# Patient Record
Sex: FEMALE | Race: WHITE
[De-identification: names, ages, dates, MRNs, and addresses within clinical notes are randomized per-mention and may not be internally consistent; named-entity substitution may affect disease eponyms.]

---

## 2017-10-13 ENCOUNTER — HOSPITAL ENCOUNTER (OUTPATIENT)
Dept: HOSPITAL 63 - MAMMO | Age: 67
Discharge: HOME | End: 2017-10-13
Attending: PHYSICIAN ASSISTANT
Payer: MEDICARE

## 2017-10-13 DIAGNOSIS — R92.8: Primary | ICD-10-CM

## 2017-10-13 NOTE — RAD
DATE: 10/13/2017



EXAM: MAMMO SANDRA DIAG BILAT



HISTORY: Follow-up dilated duct.



COMPARISON: One year earlier



This study was interpreted with the benefit of Computerized Aided Detection

(CAD).



FINDINGS:



Breast Density: SCATTERED  The breast parenchyma shows scattered

fibroglandular densities. Breast parenchyma level B. 



 There has not been a significant change in the appearance of the breasts

compared to the previous exam slight increase in perihilar density about the

left breast is unchanged.  







IMPRESSION: Benign findings







BI-RADS CATEGORY: 2 BENIGN FINDING(S)



RECOMMENDED FOLLOW-UP: 12M 12 MONTH FOLLOW-UP



PQRS compliance statement: Patient information was entered into a reminder

system with a target due date 10/13/2018 for the next mammogram.



Mammography is a sensitive method for finding small breast cancers, but it

does not detect them all and is not a substitute for careful clinical

examination.  A negative mammogram does not negate a clinically suspicious

finding and should not result in delay in biopsying a clinically suspicious

abnormality.



"Our facility is accredited by the [employer] Mammography

Program."

## 2018-10-15 ENCOUNTER — HOSPITAL ENCOUNTER (OUTPATIENT)
Dept: HOSPITAL 63 - MAMMO | Age: 68
Discharge: HOME | End: 2018-10-15
Attending: PHYSICIAN ASSISTANT
Payer: MEDICARE

## 2018-10-15 DIAGNOSIS — Z12.31: Primary | ICD-10-CM

## 2018-10-15 PROCEDURE — 77063 BREAST TOMOSYNTHESIS BI: CPT

## 2018-10-15 PROCEDURE — 77067 SCR MAMMO BI INCL CAD: CPT

## 2018-10-15 NOTE — RAD
DATE: October 15, 2018



EXAM: MAMMO SANDRA SCREENING BILATERAL



HISTORY: Screening study.



COMPARISON: 2016 and 2017.



This study was interpreted with the benefit of Computerized Aided Detection

(CAD).







FINDINGS:



Breast Density: HETERO The breast parenchyma is heterogenously dense, which

could reduce sensitivity of mammography. Breast parenchyma level C.. 

Bilateral breast nodularity and coarse calculations are stable.  There are no

new dominant suspicious masses, suspicious pleomorphic microcalcifications or

evidence of architectural distortion.





IMPRESSION: No mammographic indicators for malignancy.







BI-RADS CATEGORY: 2 BENIGN FINDING



RECOMMENDED FOLLOW-UP: 12M 12 MONTH FOLLOW-UP



PQRS compliance statement: Patient information was entered into a reminder

system with a target due date October 16, 2019 for the next mammogram.



Mammography is a sensitive method for finding small breast cancers, but it

does not detect them all and is not a substitute for careful clinical

examination.  A negative mammogram does not negate a clinically suspicious

finding and should not result in delay in biopsying a clinically suspicious

abnormality.



"Our facility is accredited by the [employer] Mammography

Program."



The patient's breast density may affect the ability of mammography to detect

breast cancer. There are 4 categories of breast density, A, B, C and D. Breast

density A means that most of the breast tissue is replaced with adipose tissue

and therefore is not dense. Breast density B means that the breast tissue is

mildly dense and scattered. Breast density C means that the breast tissue is

heterogeneously dense. Breast density D means that the breast tissue is very

dense. Breast densities especially C and D may decrease the sensitivity of

mammography to detect breast cancer. Therefore, the patient may benefit from

3-D breast mammography (3D breast tomography) as a part of their screening

mammogram. Insurance may or may not pay for this additional imaging. The

patient's breast density based on today's mammogram is category C.

## 2019-07-15 ENCOUNTER — HOSPITAL ENCOUNTER (OUTPATIENT)
Dept: HOSPITAL 63 - PMG | Age: 69
Discharge: HOME | End: 2019-07-15
Attending: PHYSICIAN ASSISTANT
Payer: MEDICARE

## 2019-07-15 DIAGNOSIS — M26.69: Primary | ICD-10-CM

## 2019-07-15 PROCEDURE — 70110 X-RAY EXAM OF JAW 4/> VIEWS: CPT

## 2019-07-15 NOTE — RAD
Indication: TMJ pain

 

TECHNIQUE: 4 views of the mandible

 

COMPARISON: None

 

FINDINGS/

impression:

Suggestion of mild right TMJ arthritis. No apparent acute fractures. 

Moderate degenerative changes in the visualized cervical spine. The 

paranasal sinuses are well-aerated.

 

Electronically signed by: Santos Francois DO (7/15/2019 8:58 AM) [facility]

## 2019-10-16 ENCOUNTER — HOSPITAL ENCOUNTER (OUTPATIENT)
Dept: HOSPITAL 63 - MAMMO | Age: 69
Discharge: HOME | End: 2019-10-16
Attending: PHYSICIAN ASSISTANT
Payer: MEDICARE

## 2019-10-16 DIAGNOSIS — N63.10: ICD-10-CM

## 2019-10-16 DIAGNOSIS — N64.89: ICD-10-CM

## 2019-10-16 DIAGNOSIS — N63.20: ICD-10-CM

## 2019-10-16 DIAGNOSIS — Z12.31: Primary | ICD-10-CM

## 2019-10-16 PROCEDURE — 77063 BREAST TOMOSYNTHESIS BI: CPT

## 2019-10-16 PROCEDURE — 77067 SCR MAMMO BI INCL CAD: CPT

## 2019-10-16 NOTE — RAD
DATE: 10/16/2019



EXAM: MAMMO SANDRA SCREENING BILATERAL



HISTORY: Routine screening



COMPARISON: 10/15/2018 and 10/13/2017 mammographic exams



This study was interpreted with the benefit of Computerized Aided Detection

(CAD).





Breast Density: SCATTERED The breast parenchyma shows scattered fibroglandular

densities. Breast parenchyma level B.





FINDINGS: Benign calcifications are present. No distortion. No mass. Left

upper outer breast biopsy clip is present. Small masses are stable.





IMPRESSION: Stable







BI-RADS CATEGORY: 2 BENIGN FINDING(S)



RECOMMENDED FOLLOW-UP: 12M 12 MONTH FOLLOW-UP



PQRS compliance statement: Patient information was entered into a reminder

system with a target due date for the next mammogram.



Mammography is a sensitive method for finding small breast cancers, but it

does not detect them all and is not a substitute for careful clinical

examination.  A negative mammogram does not negate a clinically suspicious

finding and should not result in delay in biopsying a clinically suspicious

abnormality.



"Our facility is accredited by the [employer] Mammography

Program."

## 2020-10-06 ENCOUNTER — HOSPITAL ENCOUNTER (OUTPATIENT)
Dept: HOSPITAL 63 - LAB | Age: 70
Discharge: HOME | End: 2020-10-06
Attending: PHYSICIAN ASSISTANT
Payer: MEDICARE

## 2020-10-06 DIAGNOSIS — Z20.828: Primary | ICD-10-CM

## 2021-09-21 ENCOUNTER — HOSPITAL ENCOUNTER (OUTPATIENT)
Dept: HOSPITAL 63 - MAMMO | Age: 71
End: 2021-09-21
Attending: PHYSICIAN ASSISTANT
Payer: MEDICARE

## 2021-09-21 DIAGNOSIS — Z12.31: Primary | ICD-10-CM

## 2021-09-21 PROCEDURE — 77063 BREAST TOMOSYNTHESIS BI: CPT

## 2021-09-21 PROCEDURE — 77067 SCR MAMMO BI INCL CAD: CPT

## 2021-09-21 NOTE — RAD
MG BILAT SCREEN+SANDRA 9/21/2021 2:14 PM



INDICATION: Asymptomatic screening mammogram.



COMPARISON: 10/20/2019, 10/15/2018, 10/13/2017



TECHNIQUE: 3D tomosynthesis was performed in CC and MLO projections. 2D views were obtained from the 
3D data. CAD was utilized as needed.



FINDINGS:



Breast density: Category C: The breats are heterogeneously dense, which may obscure small masses.



Right breast: There are no suspicious microcalcifications, masses or areas of architectural distortio
n.



Left breast: There is an asymmetry in the upper left breast within the retroareolar region best visua
lized on the left MLO view, possibly within the medial left breast, 1.2 cm from the nipple. Further e
valuation with spot compression CC and MLO views of the left breast is recommended as well as possibl
e ultrasound.



IMPRESSION:





1. Incomplete left mammogram. Additional imaging is recommended as detailed above.

2. Negative right mammogram.



BI-RADS category: 0; Incomplete



Recommendations: Recommend additional imaging for which the patient will need to be called back.



Electronically signed by: Stacy Howe MD (9/21/2021 2:43 PM) [id number]

## 2021-10-08 ENCOUNTER — HOSPITAL ENCOUNTER (OUTPATIENT)
Dept: HOSPITAL 63 - MAMMO | Age: 71
End: 2021-10-08
Attending: PHYSICIAN ASSISTANT
Payer: MEDICARE

## 2021-10-08 DIAGNOSIS — R92.2: Primary | ICD-10-CM

## 2021-10-08 PROCEDURE — 77065 DX MAMMO INCL CAD UNI: CPT

## 2021-10-08 PROCEDURE — 76641 ULTRASOUND BREAST COMPLETE: CPT

## 2021-10-08 NOTE — RAD
EXAM: Left breast diagnostic mammogram; left breast sonogram.



HISTORY: 70-year-old female presents for motion of nodularity within the left breast demonstrated on 
a mammogram dated 9/21/2021.



TECHNIQUE: Spot compression views left breast are obtained. Sonographic imaging of the left breast ta
rgeted to the site of mammographic nodularity was also performed.



COMPARISON: 9/21/2021, 10/16/2019, 10/15/2018



BREAST PARENCHYMAL DENSITY: Level C - Heterogeneously dense.



FINDINGS: There is a persistent small circumscribed nodular density within the 11:30 position of the 
anterior left breast. There is no architectural distortion. There are benign calcifications.



Sonographic imaging of the left breast demonstrates a 5 mm hypoechoic lesion with internal echoes at 
the 11:30 position 2 cm from the nipple, corresponding with the sonographic finding of concern. This 
appears to communicate with an adjacent atelectatic duct. This demonstrates no internal blood flow. N
o additional lesion is seen.



IMPRESSION: 

1. 5 mm hypoechoic lesion with internal echoes at the 11:30 position of the left breast 2 cm from the
 nipple, corresponding with the finding of concern on the prior screening mammogram. This is retrospe
ctively minimally increased in size compared to studies dating to 10/15/2018. The minimal interval ch
matty over a three-year interval favors a benign etiology such as a debris-filled focally dilated duct
 or complicated cyst. There is no internal blood flow to suggest a papilloma or alternative mass. Vanessa
rt-term sonographic follow-up in 6 months is recommended.

2. BI-RADS Category 3: Probably benign finding(s). Short term follow up with a left breast sonogram i
n 6 months is recommended.



If your mammogram demonstrates that you have dense breast tissue, which could hide abnormalities, and
 if you have other risk factors for breast cancer that have been identified, you might benefit from s
upplemental screening tests that may be suggested by your ordering physician.  Dense breast tissue, i
n and of itself, is a relatively common condition.  This information is not provided to cause undue c
oncern, but rather to raise your awareness and to promote discussion with your physician regarding th
e presence of other risk factors, in addition to dense breast tissue. A report of your mammography re
sults will be sent to you and your physician.  You should contact your physician if you have any ques
tions or concerns regarding this report.  



Mammography is a sensitive method for finding small breast cancers, but it does not detect them all a
nd is not a substitute for careful clinical examination.  A negative mammogram does not negate a clin
ically suspicious finding and should not result in delay in biopsying a clinically suspicious abnorma
lity.



PQRS compliance statement -  Patient information was entered into a reminder system with a target due
 date for the next mammogram. 



"Our facility is accredited by the [employer] Mammography Program."



Electronically signed by: Kathe Oliva MD (10/8/2021 2:06 PM) [id number]

## 2021-10-08 NOTE — RAD
EXAM: Left breast diagnostic mammogram; left breast sonogram.



HISTORY: 70-year-old female presents for motion of nodularity within the left breast demonstrated on 
a mammogram dated 9/21/2021.



TECHNIQUE: Spot compression views left breast are obtained. Sonographic imaging of the left breast ta
rgeted to the site of mammographic nodularity was also performed.



COMPARISON: 9/21/2021, 10/16/2019, 10/15/2018



BREAST PARENCHYMAL DENSITY: Level C - Heterogeneously dense.



FINDINGS: There is a persistent small circumscribed nodular density within the 11:30 position of the 
anterior left breast. There is no architectural distortion. There are benign calcifications.



Sonographic imaging of the left breast demonstrates a 5 mm hypoechoic lesion with internal echoes at 
the 11:30 position 2 cm from the nipple, corresponding with the sonographic finding of concern. This 
appears to communicate with an adjacent atelectatic duct. This demonstrates no internal blood flow. N
o additional lesion is seen.



IMPRESSION: 

1. 5 mm hypoechoic lesion with internal echoes at the 11:30 position of the left breast 2 cm from the
 nipple, corresponding with the finding of concern on the prior screening mammogram. This is retrospe
ctively minimally increased in size compared to studies dating to 10/15/2018. The minimal interval ch
matty over a three-year interval favors a benign etiology such as a debris-filled focally dilated duct
 or complicated cyst. There is no internal blood flow to suggest a papilloma or alternative mass. Vanessa
rt-term sonographic follow-up in 6 months is recommended.

2. BI-RADS Category 3: Probably benign finding(s). Short term follow up with a left breast sonogram i
n 6 months is recommended.



If your mammogram demonstrates that you have dense breast tissue, which could hide abnormalities, and
 if you have other risk factors for breast cancer that have been identified, you might benefit from s
upplemental screening tests that may be suggested by your ordering physician.  Dense breast tissue, i
n and of itself, is a relatively common condition.  This information is not provided to cause undue c
oncern, but rather to raise your awareness and to promote discussion with your physician regarding th
e presence of other risk factors, in addition to dense breast tissue. A report of your mammography re
sults will be sent to you and your physician.  You should contact your physician if you have any ques
tions or concerns regarding this report.  



Mammography is a sensitive method for finding small breast cancers, but it does not detect them all a
nd is not a substitute for careful clinical examination.  A negative mammogram does not negate a clin
ically suspicious finding and should not result in delay in biopsying a clinically suspicious abnorma
lity.



PQRS compliance statement -  Patient information was entered into a reminder system with a target due
 date for the next mammogram. 



"Our facility is accredited by the [employer] Mammography Program."



Electronically signed by: Kathe Oliva MD (10/8/2021 2:06 PM) [id number]

## 2022-03-25 ENCOUNTER — HOSPITAL ENCOUNTER (OUTPATIENT)
Dept: HOSPITAL 63 - US | Age: 72
End: 2022-03-25
Attending: PHYSICIAN ASSISTANT
Payer: MEDICARE

## 2022-03-25 DIAGNOSIS — R92.8: ICD-10-CM

## 2022-03-25 DIAGNOSIS — N64.89: ICD-10-CM

## 2022-03-25 DIAGNOSIS — Z09: Primary | ICD-10-CM

## 2022-03-25 PROCEDURE — 76642 ULTRASOUND BREAST LIMITED: CPT

## 2022-03-25 NOTE — RAD
US BREAST LTD LT



History:Reason: LT BREAST F/U / Spl. Instructions:  / History: 



Comparison: October 8, 2021

                                                                                                     
                                                                                                     
     

Technique:  Sonographic examination of the left breast  was performed and multiple static images were
 obtained. 



Findings:

Decreased hypoechoic lesion within the left breast 11:30 position 2 cm from the nipple measures 0.4 x
 0.4 x 0.4 cm (compared to 0.5 x 0.5 x 0.5 cm.



Impression:

1.  Decreased hypoechoic lesion within the left breast, most likely complicated cyst. Recommend retur
n to annual screening with target date of screening mammograms October 2022.



BI-RADS Category 2:  Benign. 







             



Electronically signed by: Braeden Starr DO (3/25/2022 1:04 PM) [id number]